# Patient Record
Sex: FEMALE | Race: WHITE
[De-identification: names, ages, dates, MRNs, and addresses within clinical notes are randomized per-mention and may not be internally consistent; named-entity substitution may affect disease eponyms.]

---

## 2019-04-25 ENCOUNTER — HOSPITAL ENCOUNTER (EMERGENCY)
Dept: HOSPITAL 11 - JP.ED | Age: 69
Discharge: HOME | End: 2019-04-25
Payer: MEDICARE

## 2019-04-25 DIAGNOSIS — R42: Primary | ICD-10-CM

## 2019-04-25 PROCEDURE — 99283 EMERGENCY DEPT VISIT LOW MDM: CPT

## 2019-04-25 NOTE — EDM.PDOC
ED HPI GENERAL MEDICAL PROBLEM





- General


Chief Complaint: Cardiovascular Problem


Stated Complaint: DIZZY


Time Seen by Provider: 04/25/19 05:38


Source of Information: Reports: Patient, Family, RN Notes Reviewed


History Limitations: Reports: No Limitations





- History of Present Illness


INITIAL COMMENTS - FREE TEXT/NARRATIVE: 





69-year-old female presents emergency department day complaint of dizziness and 

nausea, she states she had some dizziness last night was able to get to sleep 

awoke this morning went to the bathroom sudden onset of dizziness and nausea, 

she states she had some difficulty walking arrived in wheelchair. At this time 

the dizziness now has resolved she still feels nauseated. But feels the 

dizziness could return


  ** denies pain


Pain Score (Numeric/FACES): 0





- Related Data


 Allergies











Allergy/AdvReac Type Severity Reaction Status Date / Time


 


No Known Allergies Allergy   Verified 04/25/19 05:20











Home Meds: 


 Home Meds





. [Unable to Verify Home Med List]  04/25/19 [History]











Past Medical History





- Past Health History


Medical/Surgical History: Denies Medical/Surgical History





Social & Family History





- Tobacco Use


Smoking Status *Q: Never Smoker





ED ROS GENERAL





- Review of Systems


Review Of Systems: See Below


Constitutional: Reports: No Symptoms


HEENT: Reports: Vertigo


Respiratory: Reports: No Symptoms


Cardiovascular: Reports: No Symptoms


GI/Abdominal: Reports: Nausea





ED EXAM, GENERAL





- Physical Exam


Exam: See Below


Free Text/Narrative:: 





General: Female, not in any distress, alert and oriented x3 HEENT: head is 

atraumatic normocephalic, eyes pupils equal round reactive to light, sclera 

clear no conjunctivitis appreciated extraocular eye movements intact.  Ears 

tympanic membranes clear and gray landmarks and light reflex are present 

bilaterally canals are clear.  Nose no septal deviation, nares are clear, no 

blood present.  Mouth mucosa is moist and pink no erythema or exudate noted in 

soft palate, tongue is midline uvula is midline, dentition is intact.


Head impulse test:  Negative loss of fixation with corrective saccades when 

head turned to the bilateral





Nystagmus: unidirectional, horizontal 0-beating nystagmus





Skew deviation: grossly absent





Neck: Supple no thyromegaly no tracheal deviation.


Nodes: Cervical nodes subclavicular nodes nontender no palpable lymphadenopathy 

noted.


Lungs: clear to auscultation bilaterally with symmetrical respirations, no 

adventitious noise appreciated.


CV: Regular rate and rhythm S1 and S2 appreciated no murmurs rubs or gallops 

noted.


Abdomen: Soft, nontender, no palpable masses or organomegaly appreciated, no 

distention no guarding bowel sounds are present, .


Neuro: Cranial nerves II test with pupillary light reflex 4 mm to 2 mm 

bilaterally, CN III test pupillary constriction, limited elevation and eye 

abduction bilaterally, CN IV downward movement of eyes bilaterally, CN V good 

jaw movement, CN VI lateral deviation of the eyes bilaterally to finger movement

, CN VII symmetrical smile shows teeth without difficulty, CN VIII pass finger 

rub to ears bilaterally, CN IX adequate voice and tone, CN X adequate voice and 

tone no difficulty swallowing, CN XI can shrug shoulders without difficulty, CN 

XII can stick tongue out without difficulty, cranial nerves  II to XII intact 

as tested, Romberg is negative no pronator drift


Skin: Warm and dry, intact








Course





- Vital Signs


Last Recorded V/S: 


 Last Vital Signs











Temp      


 


Pulse  72   04/25/19 05:22


 


Resp  11 L  04/25/19 05:22


 


BP  152/83 H  04/25/19 05:22


 


Pulse Ox  95   04/25/19 05:22














- Orders/Labs/Meds


Meds: 


Medications














Discontinued Medications














Generic Name Dose Route Start Last Admin





  Trade Name Panchitoq  PRN Reason Stop Dose Admin


 


Meclizine HCl  25 mg  04/25/19 05:37  04/25/19 05:49





  Antivert  PO  04/25/19 05:38  25 mg





  ONETIME ONE   Administration





     





     





     





     


 


Ondansetron HCl  4 mg  04/25/19 05:37  04/25/19 05:49





  Zofran Odt  PO  04/25/19 05:38  4 mg





  ONETIME ONE   Administration





     





     





     





     














Departure





- Departure


Time of Disposition: 06:29


Disposition: Home, Self-Care 01


Condition: Fair


Clinical Impression: 


 Vertigo





Referrals: 


PCP,None [Primary Care Provider] - 


Forms:  ED Department Discharge


Additional Instructions: 


Use meclizine as needed for dizziness symptoms,,  Please followup with your 

primary care provider in 3-5 days if not better, please call return to the 

emergency department with worsening of symptoms.





- Assessment/Plan


Plan: 





Assessment





Acuity = acute





Site and laterality = vertigo  





Etiology  = unclear etiology  





Manifestations = nausea now resolved 





Location of injury =  Home





Lab values = none  





Plan


She had good improvement combination meclizine and Zofran plan is discharge 

home with meclizine 25 mg follow-up primary care 3-5 days if not better  

















 This note was dictated using dragon voice recognition software please call 

with any questions on syntax or grammar.